# Patient Record
Sex: MALE | Race: WHITE
[De-identification: names, ages, dates, MRNs, and addresses within clinical notes are randomized per-mention and may not be internally consistent; named-entity substitution may affect disease eponyms.]

---

## 2017-01-26 ENCOUNTER — HOSPITAL ENCOUNTER (OUTPATIENT)
Dept: HOSPITAL 80 - BMCIMAGING | Age: 67
End: 2017-01-26
Attending: INTERNAL MEDICINE
Payer: COMMERCIAL

## 2017-01-26 DIAGNOSIS — M53.3: Primary | ICD-10-CM

## 2017-01-26 NOTE — DX
Sacroiliac Joints, 3 Views



History: Pain.



Findings: Mild sclerosis is seen in the sacroiliac joints bilaterally. No evidence for periarticular 
erosion or osseous bridging. There is severe degenerative disk and degenerative joint disease in the 
lower lumbar spine. Vascular clips are seen in the pelvis from prior surgery. Mild degenerative kelley
e is seen in the symphysis pubis and both hips.



Impression: Mild sclerosis of both SI joints which could represent degenerative change or less likely
 sacroiliitis. Other degenerative change as above.

## 2018-07-09 ENCOUNTER — HOSPITAL ENCOUNTER (EMERGENCY)
Dept: HOSPITAL 80 - FED | Age: 68
Discharge: HOME | End: 2018-07-09
Payer: COMMERCIAL

## 2018-07-09 VITALS — DIASTOLIC BLOOD PRESSURE: 83 MMHG | SYSTOLIC BLOOD PRESSURE: 131 MMHG

## 2018-07-09 DIAGNOSIS — Z87.891: ICD-10-CM

## 2018-07-09 DIAGNOSIS — Z95.5: ICD-10-CM

## 2018-07-09 DIAGNOSIS — I10: ICD-10-CM

## 2018-07-09 DIAGNOSIS — R55: Primary | ICD-10-CM

## 2018-07-09 DIAGNOSIS — I25.10: ICD-10-CM

## 2018-07-09 DIAGNOSIS — E86.9: ICD-10-CM

## 2018-07-09 LAB — PLATELET # BLD: 243 10^3/UL (ref 150–400)

## 2018-07-09 NOTE — CPEKG
Heart Rate: 78

RR Interval: 769

P-R Interval: 200

QRSD Interval: 102

QT Interval: 400

QTC Interval: 456

P Axis: 18

QRS Axis: 8

T Wave Axis: 4

EKG Severity - BORDERLINE ECG -

EKG Impression: SINUS RHYTHM

EKG Impression: PROBABLE LEFT ATRIAL ABNORMALITY

Electronically Signed By: Marko Caballero 09-Jul-2018 13:00:15

## 2018-07-09 NOTE — EDPHY
H & P


Stated Complaint: dizzy, SOB


Time Seen by Provider: 07/09/18 12:49


HPI/ROS: 





CHIEF COMPLAINT:  Dizziness, palpitations, questionable weight loss





HISTORY OF PRESENT ILLNESS:  The patient is referred to the emergency 

department for several months of dizziness, what can best be described is brief 

palpitations, fatigue and increasing sleep.  The patient reports he may have 

had some weight loss over past several weeks.  He denies fever, diarrhea, 

vomiting, cough, asymmetric calf pain or swelling, chest pain, the headache or 

neck pain.  The patient did have a syncopal event several weeks ago which 

resulted in a fall and striking his head.  The patient states he experiences a 

mild intermittent headache which is not changed in frequency since his fall.  

The patient does have a history of BPH.  He has chronic urinary frequency but 

denies dysuria.  The patient takes medications for hypertension and 

hyperlipidemia.





REVIEW OF SYSTEMS:


A comprehensive 10 point review of systems is otherwise negative aside from 

elements mentioned in the history of present illness.


Source: Patient


Exam Limitations: No limitations





- Personal History


Current Tetanus/Diphtheria Vaccine: Yes


Current Tetanus Diphtheria and Acellular Pertussis (TDAP): Yes





- Medical/Surgical History


Hx Asthma: No


Hx Chronic Respiratory Disease: No


Hx Diabetes: No


Hx Cardiac Disease: Yes


Hx Renal Disease: No


Hx Cirrhosis: No


Hx Alcoholism: Yes


Hx HIV/AIDS: No


Hx Splenectomy or Spleen Trauma: No


Other PMH: HTN, hyperlipidemia, CAD with stents





- Social History


Smoking Status: Former smoker





- Physical Exam


Exam: 





General Appearance:  Alert, no distress


Eyes:  Pupils equal and round no pallor or injection


ENT, Mouth:  Mucous membranes moist


Respiratory:  There are no retractions, lungs are clear to auscultation


Cardiovascular:  Regular rate and rhythm


Gastrointestinal:  Abdomen is soft and nontender, no masses, bowel sounds normal


Neurological:  A&O, normal motor function, normal sensory exam, normal cranial 

nerves


Skin:  Warm and dry, no rashes


Musculoskeletal:  Neck is supple nontender


Extremities:  symmetrical, full range of motion


Psychiatric:  Patient is oriented X 3, there is no agitation


Constitutional: 


 Initial Vital Signs











Temperature (C)  36.8 C   07/09/18 12:24


 


Heart Rate  78   07/09/18 12:24


 


Respiratory Rate  16   07/09/18 12:24


 


Blood Pressure  137/79 H  07/09/18 12:24


 


O2 Sat (%)  95   07/09/18 12:24








 











O2 Delivery Mode               Room Air














Allergies/Adverse Reactions: 


 





No Known Allergies Allergy (Unverified 07/09/18 12:22)


 








Home Medications: 














 Medication  Instructions  Recorded


 


Bp Med  07/09/18


 


Cholesterol Med  07/09/18














Medical Decision Making





- Diagnostics


EKG Interpretation: 





EKG:  Complete interpretation has been separately recorded in the [email or web address] 

archive.  Summary impression:  Sinus rhythm, rate 78, no ST segment elevation 

or depression noted


Imaging Results: 


 Imaging Impressions





Chest X-Ray  07/09/18 12:57


Impression: Possible airways disease. No pneumonia.


 











ED Course/Re-evaluation: 





The patient presents the emergency department with several months of vague 

symptoms including fatigue, palpitations and presyncope.  His workup in the 

emergency department is unremarkable.  His vital signs are stable.  He has no 

noted arrhythmia in the ED.  His troponin is normal.  He has no metabolic 

abnormality.  His chest x-ray demonstrates no evidence of obvious disease.  At 

this point time I see no obvious acute medical condition.  I do feel would be 

reasonable to have him follow up with Cardiology for consideration of an 

echocardiogram and possibly a Holter monitor.  The patient is comfortable being 

discharged home.  He is given customary return precautions.


Differential Diagnosis: 





Differential diagnosis considered includes arrhythmia, dehydration, metabolic 

abnormality, myocardial infarction, pneumonia





- Data Points


Laboratory Results: 


 Laboratory Results





 07/09/18 13:10 





 07/09/18 13:10 





 











  07/09/18 07/09/18





  13:10 13:10


 


WBC    6.36 10^3/uL 10^3/uL





    (3.80-9.50) 


 


RBC    4.38 10^6/uL L 10^6/uL





    (4.40-6.38) 


 


Hgb    14.0 g/dL g/dL





    (13.7-17.5) 


 


Hct    40.4 % %





    (40.0-51.0) 


 


MCV    92.2 fL fL





    (81.5-99.8) 


 


MCH    32.0 pg pg





    (27.9-34.1) 


 


MCHC    34.7 g/dL g/dL





    (32.4-36.7) 


 


RDW    12.1 % %





    (11.5-15.2) 


 


Plt Count    243 10^3/uL 10^3/uL





    (150-400) 


 


MPV    8.6 fL L fL





    (8.7-11.7) 


 


Neut % (Auto)    76.5 % H %





    (39.3-74.2) 


 


Lymph % (Auto)    13.4 % L %





    (15.0-45.0) 


 


Mono % (Auto)    8.3 % %





    (4.5-13.0) 


 


Eos % (Auto)    1.1 % %





    (0.6-7.6) 


 


Baso % (Auto)    0.5 % %





    (0.3-1.7) 


 


Nucleat RBC Rel Count    0.0 % %





    (0.0-0.2) 


 


Absolute Neuts (auto)    4.87 10^3/uL 10^3/uL





    (1.70-6.50) 


 


Absolute Lymphs (auto)    0.85 10^3/uL L 10^3/uL





    (1.00-3.00) 


 


Absolute Monos (auto)    0.53 10^3/uL 10^3/uL





    (0.30-0.80) 


 


Absolute Eos (auto)    0.07 10^3/uL 10^3/uL





    (0.03-0.40) 


 


Absolute Basos (auto)    0.03 10^3/uL 10^3/uL





    (0.02-0.10) 


 


Absolute Nucleated RBC    0.00 10^3/uL 10^3/uL





    (0-0.01) 


 


Immature Gran %    0.2 % %





    (0.0-1.1) 


 


Immature Gran #    0.01 10^3/uL 10^3/uL





    (0.00-0.10) 


 


Sodium  139 mEq/L mEq/L  





   (135-145)  


 


Potassium  3.6 mEq/L mEq/L  





   (3.3-5.0)  


 


Chloride  109 mEq/L mEq/L  





   ()  


 


Carbon Dioxide  23 mEq/l mEq/l  





   (22-31)  


 


Anion Gap  7 mEq/L L mEq/L  





   (8-16)  


 


BUN  15 mg/dL mg/dL  





   (7-23)  


 


Creatinine  0.9 mg/dL mg/dL  





   (0.7-1.3)  


 


Estimated GFR  > 60   





   


 


Glucose  78 mg/dL mg/dL  





   ()  


 


Calcium  9.3 mg/dL mg/dL  





   (8.5-10.4)  


 


Troponin I  < 0.012 ng/mL ng/mL  





   (0.000-0.034)  











Medications Given: 


 








Discontinued Medications





Sodium Chloride (Ns)  1,000 mls @ 0 mls/hr IV EDNOW ONE; Wide Open


   PRN Reason: Protocol


   Stop: 07/09/18 12:58


   Last Admin: 07/09/18 13:11 Dose:  1,000 mls








Departure





- Departure


Disposition: Home, Routine, Self-Care


Clinical Impression: 


 Pre-syncope





Condition: Good


Instructions:  Near Syncope (ED)


Additional Instructions: 


1. Please follow up with the cardiologist you have been referred to for further 

testing.  The workup in the emergency department today is unrevealing.


2. Please return to the ED for markedly worsening symptoms, chest pain, 

shortness of breath or other concerns.








Referrals: 


Roxy Camacho MD [Primary Care Provider] - As per Instructions


Ronan Melgar MD [Medical Doctor] - As per Instructions

## 2019-04-20 ENCOUNTER — HOSPITAL ENCOUNTER (EMERGENCY)
Dept: HOSPITAL 80 - FED | Age: 69
Discharge: HOME | End: 2019-04-20
Payer: COMMERCIAL

## 2019-04-20 VITALS — DIASTOLIC BLOOD PRESSURE: 88 MMHG | SYSTOLIC BLOOD PRESSURE: 145 MMHG

## 2019-04-20 DIAGNOSIS — R31.0: ICD-10-CM

## 2019-04-20 DIAGNOSIS — T83.018A: Primary | ICD-10-CM

## 2019-04-20 DIAGNOSIS — N40.1: ICD-10-CM
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
## 2019-04-20 DIAGNOSIS — Z87.891: ICD-10-CM

## 2019-04-20 NOTE — EDPHY
H & P


Stated Complaint: catheter fell out


Time Seen by Provider: 04/20/19 19:26


HPI/ROS: 





CHIEF COMPLAINT:  Pavon catheter fell out





HISTORY OF PRESENT ILLNESS:  The patient is a 68-year-old man who comes to the 

emergency department because his Pavon catheter fell out.  He had a hot steam 

procedure done on Wednesday for BPH.  Pavon catheter was placed the time and 

was scheduled to be removed on Monday.  Today when he was in the bathroom it 

fell out.  The balloon is not inflated.  Since it fell out he has passed a 

couple of blood clots along with urine.  He has not felt any retention or pain.

  No fevers.  No trauma.  He called the urology office of Dr. Edwards and 

talk to the urologist on call.  They told him as long as he continued to 

urinate and was able to pass the clots he did not need to come and have a Pavon 

catheter replaced but that he could if he wanted to.  He was not sure if he 

should or not but after talking to his wife decided to come and get checked out.


Severity:  Moderate


Modifying factors:  None





REVIEW OF SYSTEMS:


Constitutional:  denies: chills, fever, recent illness, recent injury


EENTM: denies: blurred vision, double vision, nose congestion


Respiratory: denies: cough, shortness of breath


Cardiac: denies: chest pain, irregular heart rate, lightheadedness, palpitations


Gastrointestinal/Abdominal: denies: abdominal pain, diarrhea, nausea, vomiting, 

blood streaked stools


Genitourinary:  See HPI


Musculoskeletal: denies: joint pain, muscle pain


Skin: denies: lesions, rash, jaundice, bruising


Neurological: denies: headache, numbness, paresthesia, tingling, dizziness, 

weakness


Hematologic/Lymphatic: denies: blood clots, easy bleeding, easy bruising


Immunologic/allergic: denies: HIV/AIDS, transplant


 10 systems reviewed and negative except as noted





EXAM:


GENERAL:  Well-appearing, well-nourished and in no acute distress.


HEAD:  Atraumatic, normocephalic.


EYES:  Pupils equal round and reactive to light, extraocular movements intact, 

sclera anicteric, conjunctiva are normal.


ENT:  TMs normal, nares patent, oropharynx clear without exudates.  Moist 

mucous membranes.


NECK:  Normal range of motion, supple without lymphadenopathy or JVD.


LUNGS:  Breath sounds clear to auscultation bilaterally and equal.  No wheezes 

rales or rhonchi.


HEART:  Regular rate and rhythm without murmurs, rubs or gallops.


ABDOMEN:  Soft, nontender, normoactive bowel sounds.  No guarding, no rebound.  

No masses appreciated. 


:  Dripping blood from meatus.


BACK:  No CVA tenderness, no spinal tenderness, step-offs or deformities


EXTREMITIES:  Normal range of motion, no pitting or edema.  No clubbing or 

cyanosis.


NEUROLOGICAL:  Cranial nerves II through XII grossly intact.  Normal speech, 

normal gait.  5/5 strength, normal movement in all extremities, normal sensation

, normal reflexes


PSYCH:  Normal mood, normal affect.


SKIN:  Warm, dry, normal turgor, no visible rashes or lesions.








Source: Patient


Exam Limitations: No limitations





- Personal History


Current Tetanus Diphtheria and Acellular Pertussis (TDAP): Yes





- Medical/Surgical History


Hx Asthma: No


Hx Chronic Respiratory Disease: No


Hx Diabetes: No


Hx Cardiac Disease: Yes


Hx Renal Disease: No


Hx Cirrhosis: No


Hx Alcoholism: Yes


Hx HIV/AIDS: No


Hx Splenectomy or Spleen Trauma: No


Other PMH: HTN, hyperlipidemia, CAD with stents





- Family History


Significant Family History: No pertinent family hx





- Social History


Smoking Status: Former smoker


Alcohol Use: None


Constitutional: 


 Initial Vital Signs











Temperature (C)  36.4 C   04/20/19 19:23


 


Heart Rate  77   04/20/19 19:23


 


Respiratory Rate  16   04/20/19 19:23


 


Blood Pressure  142/88 H  04/20/19 19:23


 


O2 Sat (%)  94   04/20/19 19:23








 











O2 Delivery Mode               Room Air














Allergies/Adverse Reactions: 


 





No Known Allergies Allergy (Unverified 07/09/18 12:22)


 








Home Medications: 














 Medication  Instructions  Recorded


 


Bp Med  07/09/18


 


Cholesterol Med  07/09/18














Medical Decision Making


ED Course/Re-evaluation: 





Patient is having some mild bleeding from his urethra.  He is able to pass 

clots and is not currently retaining urine.  His urologist told him that 

replacing the catheter is optional.  I reiterated this message.  I emphasized 

that if we do not replace the catheter now there is a chance that he may become 

obstructed and require catheterization later this evening or tomorrow.  He 

understands this but was very uncomfortable with a catheter and does not wish 

to have it replaced.  We will hydrate him and observed to see if he continues 

being able to urinate.





8:20 p.m. the patient was able to urinate without difficulty.  He continues to 

pass small clots.  He feels comfortable going home and does not want a 

catheter.  Discussed indications for returning length.


Differential Diagnosis: 





Partial list of the Differential diagnosis considered include but were not 

limited to;  Pavon catheter dysfunction, hematuria, urinary retention and 

although unlikely based on the history and physical exam, I also considered 

infection, hemorrhage.  I discussed these differential diagnoses and the plan 

with the patient as well as the usual and expected course.  The patient 

understands that the diagnosis is provisional and that in medicine we are not 

always correct and that further workup is often warranted.  Usual and customary 

warnings were given.  All of the patient's questions were answered.  The 

patient was instructed to return to the emergency department should the 

symptoms at all worsen or return, otherwise to followup with the physician as 

we discussed.





Departure





- Departure


Disposition: Home, Routine, Self-Care


Clinical Impression: 


 Pavon catheter dysfunction





Hematuria


Qualifiers:


 Hematuria type: gross Qualified Code(s): R31.0 - Gross hematuria





Condition: Fair


Instructions:  Hematuria (ED), Pavon Catheter Removal (DC)


Referrals: 


Unknown,Unknown [Primary Care Provider] - As per Instructions


Maria Isabel Alves MD [Medical Doctor] - As per Instructions

## 2019-04-21 ENCOUNTER — HOSPITAL ENCOUNTER (EMERGENCY)
Dept: HOSPITAL 80 - FED | Age: 69
Discharge: HOME | End: 2019-04-21
Payer: COMMERCIAL

## 2019-04-21 VITALS — DIASTOLIC BLOOD PRESSURE: 89 MMHG | SYSTOLIC BLOOD PRESSURE: 151 MMHG

## 2019-04-21 DIAGNOSIS — Z87.891: ICD-10-CM

## 2019-04-21 DIAGNOSIS — I25.10: ICD-10-CM

## 2019-04-21 DIAGNOSIS — E78.5: ICD-10-CM

## 2019-04-21 DIAGNOSIS — N40.1: Primary | ICD-10-CM

## 2019-04-21 DIAGNOSIS — I10: ICD-10-CM

## 2019-04-21 DIAGNOSIS — R33.9: ICD-10-CM

## 2019-04-21 PROCEDURE — 4A0D7LZ MEASUREMENT OF URINARY VOLUME, VIA NATURAL OR ARTIFICIAL OPENING: ICD-10-PCS

## 2019-04-21 PROCEDURE — 0T9B70Z DRAINAGE OF BLADDER WITH DRAINAGE DEVICE, VIA NATURAL OR ARTIFICIAL OPENING: ICD-10-PCS

## 2019-04-21 NOTE — EDPHY
H & P


Stated Complaint: urinary retention


Time Seen by Provider: 04/21/19 06:53


HPI/ROS: 





CHIEF COMPLAINT:  Unable to urinate





HISTORY OF PRESENT ILLNESS:  68-year-old male with BPH presents with inability 

to urinate.  He had a hot steam procedure done for BPH 4 days ago.  He had a 

Pavon catheter in place after the procedure.  The catheter accidentally fell 

out yesterday.  He came to the emergency department yesterday evening because 

of gross hematuria.  He declined catheter placement at that time.  He was 

unable to sleep last night because of inability to urinate and gradually 

increasing suprapubic pressure.  He now requests a catheter placement.  He has 

a follow-up appointment with Urology tomorrow.





REVIEW OF SYSTEMS:  complete 10 point ROS reviewed and is negative except for 

the noted elements in the HPI








- Personal History


Current Tetanus/Diphtheria Vaccine: Yes


Current Tetanus Diphtheria and Acellular Pertussis (TDAP): Yes





- Medical/Surgical History


Hx Asthma: No


Hx Chronic Respiratory Disease: No


Hx Diabetes: No


Hx Cardiac Disease: Yes


Hx Renal Disease: No


Hx Cirrhosis: No


Hx Alcoholism: Yes


Hx HIV/AIDS: No


Hx Splenectomy or Spleen Trauma: No


Other PMH: HTN, hyperlipidemia, CAD with stents





- Social History


Smoking Status: Former smoker





- Physical Exam


Exam: 





General Appearance:  Alert, pleasant, seen after catheter placement


Eyes:  Pupils equal and round, no conjunctival pallor


ENT, Mouth:  Mucous membranes moist


Neck:  Normal inspection


Respiratory:  Normal respiratory rate


Cardiovascular:  Regular rate and rhythm


Gastrointestinal:  Abdomen is soft and nontender


Neurological:  A&O, nonfocal exam


Skin:  Warm and dry


Extremities:  Normal inspection


Psychiatric:  Mood and affect normal





Constitutional: 





 Initial Vital Signs











Temperature (C)  36.6 C   04/21/19 06:34


 


Heart Rate  66   04/21/19 06:34


 


Respiratory Rate  16   04/21/19 06:34


 


Blood Pressure  177/89 H  04/21/19 06:34


 


O2 Sat (%)  96   04/21/19 06:34








 











O2 Delivery Mode               Room Air














Allergies/Adverse Reactions: 


 





No Known Allergies Allergy (Unverified 04/21/19 06:34)


 








Home Medications: 














 Medication  Instructions  Recorded


 


Bp Med  07/09/18


 


Cholesterol Med  07/09/18














Medical Decision Making


ED Course/Re-evaluation: 





Bladder scan revealed 700 mL of urine.  A Pavon catheter was placed.  The 

catheter was irrigated and a couple of small blood clots were removed.  The 

catheter then flowed freely and the patient felt much better.  He will be 

discharged home with a Pavon catheter in place.  Plan to follow up with Urology 

tomorrow.





- Data Points


Medications Given: 





 








Discontinued Medications





Lidocaine (Uroject Lidocaine 2% Jelly)  20 ml UR EDNOW ONE


   Stop: 04/21/19 07:06


   Last Admin: 04/21/19 07:06 Dose:  20 ml








Departure





- Departure


Disposition: Home, Routine, Self-Care


Clinical Impression: 


 Acute retention of urine





Condition: Good


Instructions:  Urinary Retention in Men (ED), Pavon Catheter Placement and Care 

(ED)


Referrals: 


Maria Isabel Alves MD [Medical Doctor] - As per Instructions (Keep your 

appointment for tomorrow.)